# Patient Record
Sex: MALE
[De-identification: names, ages, dates, MRNs, and addresses within clinical notes are randomized per-mention and may not be internally consistent; named-entity substitution may affect disease eponyms.]

---

## 2017-04-05 ENCOUNTER — APPOINTMENT (OUTPATIENT)
Dept: ENDOCRINOLOGY | Facility: CLINIC | Age: 67
End: 2017-04-05

## 2017-04-05 VITALS
BODY MASS INDEX: 24.11 KG/M2 | HEIGHT: 72 IN | HEART RATE: 100 BPM | DIASTOLIC BLOOD PRESSURE: 81 MMHG | SYSTOLIC BLOOD PRESSURE: 129 MMHG | WEIGHT: 178 LBS

## 2017-04-07 LAB
ANION GAP SERPL CALC-SCNC: 19 MMOL/L
BUN SERPL-MCNC: 17 MG/DL
CALCIUM SERPL-MCNC: 10.4 MG/DL
CHLORIDE SERPL-SCNC: 97 MMOL/L
CO2 SERPL-SCNC: 25 MMOL/L
CREAT SERPL-MCNC: 1.01 MG/DL
GLUCOSE SERPL-MCNC: 152 MG/DL
HBA1C MFR BLD HPLC: 8.5 %
POTASSIUM SERPL-SCNC: 4.4 MMOL/L
SODIUM SERPL-SCNC: 141 MMOL/L

## 2017-05-03 ENCOUNTER — MEDICATION RENEWAL (OUTPATIENT)
Age: 67
End: 2017-05-03

## 2017-05-08 ENCOUNTER — MEDICATION RENEWAL (OUTPATIENT)
Age: 67
End: 2017-05-08

## 2017-06-02 ENCOUNTER — RX RENEWAL (OUTPATIENT)
Age: 67
End: 2017-06-02

## 2017-08-07 ENCOUNTER — APPOINTMENT (OUTPATIENT)
Dept: ENDOCRINOLOGY | Facility: CLINIC | Age: 67
End: 2017-08-07
Payer: COMMERCIAL

## 2017-08-07 VITALS
HEART RATE: 89 BPM | SYSTOLIC BLOOD PRESSURE: 147 MMHG | BODY MASS INDEX: 24.38 KG/M2 | DIASTOLIC BLOOD PRESSURE: 90 MMHG | HEIGHT: 72 IN | WEIGHT: 180 LBS

## 2017-08-07 PROCEDURE — 99214 OFFICE O/P EST MOD 30 MIN: CPT

## 2017-08-09 LAB
ANION GAP SERPL CALC-SCNC: 19 MMOL/L
BUN SERPL-MCNC: 15 MG/DL
CALCIUM SERPL-MCNC: 9.9 MG/DL
CHLORIDE SERPL-SCNC: 100 MMOL/L
CO2 SERPL-SCNC: 24 MMOL/L
CREAT SERPL-MCNC: 1.07 MG/DL
GLUCOSE SERPL-MCNC: 211 MG/DL
HBA1C MFR BLD HPLC: 8.9 %
POTASSIUM SERPL-SCNC: 4.4 MMOL/L
SODIUM SERPL-SCNC: 143 MMOL/L

## 2017-11-15 ENCOUNTER — MEDICATION RENEWAL (OUTPATIENT)
Age: 67
End: 2017-11-15

## 2018-01-25 ENCOUNTER — APPOINTMENT (OUTPATIENT)
Dept: ENDOCRINOLOGY | Facility: CLINIC | Age: 68
End: 2018-01-25
Payer: COMMERCIAL

## 2018-01-25 VITALS
WEIGHT: 176 LBS | BODY MASS INDEX: 23.87 KG/M2 | DIASTOLIC BLOOD PRESSURE: 90 MMHG | HEART RATE: 96 BPM | SYSTOLIC BLOOD PRESSURE: 180 MMHG

## 2018-01-25 PROCEDURE — 99214 OFFICE O/P EST MOD 30 MIN: CPT

## 2018-01-26 LAB
ALBUMIN SERPL ELPH-MCNC: 4.8 G/DL
ALP BLD-CCNC: 97 U/L
ALT SERPL-CCNC: 23 U/L
ANION GAP SERPL CALC-SCNC: 17 MMOL/L
AST SERPL-CCNC: 20 U/L
BILIRUB SERPL-MCNC: 0.6 MG/DL
BUN SERPL-MCNC: 15 MG/DL
CALCIUM SERPL-MCNC: 10.2 MG/DL
CHLORIDE SERPL-SCNC: 97 MMOL/L
CHOLEST SERPL-MCNC: 155 MG/DL
CHOLEST/HDLC SERPL: 3 RATIO
CO2 SERPL-SCNC: 26 MMOL/L
CREAT SERPL-MCNC: 1.1 MG/DL
CREAT SPEC-SCNC: 172 MG/DL
GLUCOSE SERPL-MCNC: 124 MG/DL
HBA1C MFR BLD HPLC: 8.6 %
HDLC SERPL-MCNC: 51 MG/DL
LDLC SERPL CALC-MCNC: 87 MG/DL
MICROALBUMIN 24H UR DL<=1MG/L-MCNC: 3.4 MG/DL
MICROALBUMIN/CREAT 24H UR-RTO: 20 MG/G
POTASSIUM SERPL-SCNC: 4.4 MMOL/L
PROT SERPL-MCNC: 6.9 G/DL
SODIUM SERPL-SCNC: 140 MMOL/L
TRIGL SERPL-MCNC: 84 MG/DL
TSH SERPL-ACNC: 1.19 UIU/ML

## 2018-01-30 ENCOUNTER — MEDICATION RENEWAL (OUTPATIENT)
Age: 68
End: 2018-01-30

## 2018-05-07 ENCOUNTER — MEDICATION RENEWAL (OUTPATIENT)
Age: 68
End: 2018-05-07

## 2018-07-25 ENCOUNTER — APPOINTMENT (OUTPATIENT)
Dept: ENDOCRINOLOGY | Facility: CLINIC | Age: 68
End: 2018-07-25
Payer: COMMERCIAL

## 2018-07-25 VITALS
BODY MASS INDEX: 24.11 KG/M2 | HEIGHT: 72 IN | SYSTOLIC BLOOD PRESSURE: 129 MMHG | HEART RATE: 103 BPM | DIASTOLIC BLOOD PRESSURE: 82 MMHG | WEIGHT: 178 LBS

## 2018-07-25 PROCEDURE — 99214 OFFICE O/P EST MOD 30 MIN: CPT

## 2018-07-25 RX ORDER — FLASH GLUCOSE SENSOR
KIT MISCELLANEOUS
Qty: 1 | Refills: 0 | Status: DISCONTINUED | COMMUNITY
Start: 2018-01-30 | End: 2018-07-25

## 2018-07-25 RX ORDER — FLASH GLUCOSE SENSOR
KIT MISCELLANEOUS
Qty: 3 | Refills: 5 | Status: DISCONTINUED | COMMUNITY
Start: 2018-01-30 | End: 2018-07-25

## 2018-07-26 LAB
ANION GAP SERPL CALC-SCNC: 15 MMOL/L
BUN SERPL-MCNC: 12 MG/DL
CALCIUM SERPL-MCNC: 10.1 MG/DL
CHLORIDE SERPL-SCNC: 101 MMOL/L
CO2 SERPL-SCNC: 27 MMOL/L
CREAT SERPL-MCNC: 1.04 MG/DL
GLUCOSE SERPL-MCNC: 76 MG/DL
HBA1C MFR BLD HPLC: 8.4 %
POTASSIUM SERPL-SCNC: 4.9 MMOL/L
SODIUM SERPL-SCNC: 143 MMOL/L

## 2018-11-12 ENCOUNTER — MEDICATION RENEWAL (OUTPATIENT)
Age: 68
End: 2018-11-12

## 2018-11-28 ENCOUNTER — APPOINTMENT (OUTPATIENT)
Dept: ENDOCRINOLOGY | Facility: CLINIC | Age: 68
End: 2018-11-28

## 2019-01-28 ENCOUNTER — RX RENEWAL (OUTPATIENT)
Age: 69
End: 2019-01-28

## 2019-01-31 ENCOUNTER — RX RENEWAL (OUTPATIENT)
Age: 69
End: 2019-01-31

## 2019-03-20 ENCOUNTER — APPOINTMENT (OUTPATIENT)
Dept: ENDOCRINOLOGY | Facility: CLINIC | Age: 69
End: 2019-03-20
Payer: COMMERCIAL

## 2019-03-20 VITALS
WEIGHT: 173 LBS | BODY MASS INDEX: 23.46 KG/M2 | HEART RATE: 114 BPM | SYSTOLIC BLOOD PRESSURE: 122 MMHG | DIASTOLIC BLOOD PRESSURE: 79 MMHG

## 2019-03-20 PROCEDURE — 36415 COLL VENOUS BLD VENIPUNCTURE: CPT

## 2019-03-20 PROCEDURE — 99214 OFFICE O/P EST MOD 30 MIN: CPT | Mod: 25

## 2019-03-20 NOTE — ASSESSMENT
[FreeTextEntry1] : Diabetes, with microalbuminuria, on ACEI.  Suboptimal control,  goal A1c < 7.0%. I am agreeable to him trying afrezza.  will send for PFTs and if normal, then will prescribe afrezza.  Explained that he will still need to inject basal insulin once/daily and take afrezza, which comes in different dosing blister packs, before meals.  Advised him to check sugars more regularly, samanta after changing to afrezza, so I know if it is controlling sugars or not.\par RTO 4 months

## 2019-03-20 NOTE — PHYSICAL EXAM
[Alert] : alert [Healthy Appearance] : healthy appearance [Normal Voice/Communication] : normal voice communication [No Proptosis] : no proptosis [No Lid Lag] : no lid lag [Normal Hearing] : hearing was normal [Thyroid Not Enlarged] : the thyroid was not enlarged [No Thyroid Nodules] : there were no palpable thyroid nodules [Clear to Auscultation] : lungs were clear to auscultation bilaterally [Normal S1, S2] : normal S1 and S2 [Regular Rhythm] : with a regular rhythm [Pedal Pulses Normal] : the pedal pulses are present [No Edema] : there was no peripheral edema [No Stigmata of Cushings Syndrome] : no stigmata of cushings syndrome [Normal Sensation on Monofilament Testing] : normal sensation on monofilament testing of lower extremities [Normal Insight/Judgement] : insight and judgment were intact [Normal Affect] : the affect was normal [Normal Mood] : the mood was normal [Foot Ulcers] : no foot ulcers [Acanthosis Nigricans] : no acanthosis nigricans [de-identified] : fingerstick 198, pre lunch/6h post breakfast (cornflakes, berries, crackers with PB, 30 units) [de-identified] : reg tachy [de-identified] : no onychomycoses

## 2019-03-20 NOTE — DATA REVIEWED
[FreeTextEntry1] : 7/18: A1c 8.4%, Cr 1.04\par 1/18: A1c 8.6%, tot chol 155, trig 84, HDL 51, LDL 87, urine microalb/cr 20, TSH 1.19, Cr 1.10\par 8/17: A1c 8.9%,Cr .1.07\par 4/17: A1c 8.5%, Cr 1.01, \par 12/16: A1c 10.0%, tot chol 164, trig 186, HDL 43, LDL 84, urine microalb/cr 18, Cr 1.12\par 4/16: A1c 7.8%\par 10/15: A1c 9.6%, Cr 0.79, tot chol 127, trig 201, HDL 37, LDL 50, TSH 0.792, urine microalb/cr 29

## 2019-03-20 NOTE — HISTORY OF PRESENT ILLNESS
[FreeTextEntry1] : not testing glucose at home.\par appetite is reduced and he lost weight\par urinating less. \par interested in inhaled insulin because it is difficulty to inject insulin for lunch\par still has not seen ophtho\par no chest pain or SOB. has stable neuropathy symptoms, mild.\par \par Meds: Humalog mix 50/50, 32 units with breakfast, 25-30 with lunch, 25-32 with dinner\par simvastatin 10mg\par lisinopril 20mg once/day (did not increase to BID)\par HCTZ 25mg--stopped taking

## 2019-03-21 LAB
ALBUMIN SERPL ELPH-MCNC: 4.9 G/DL
ALP BLD-CCNC: 110 U/L
ALT SERPL-CCNC: 18 U/L
ANION GAP SERPL CALC-SCNC: 19 MMOL/L
AST SERPL-CCNC: 16 U/L
BILIRUB SERPL-MCNC: 0.3 MG/DL
BUN SERPL-MCNC: 12 MG/DL
CALCIUM SERPL-MCNC: 9.8 MG/DL
CHLORIDE SERPL-SCNC: 99 MMOL/L
CHOLEST SERPL-MCNC: 131 MG/DL
CHOLEST/HDLC SERPL: 3.1 RATIO
CO2 SERPL-SCNC: 22 MMOL/L
CREAT SERPL-MCNC: 0.9 MG/DL
CREAT SPEC-SCNC: 173 MG/DL
GLUCOSE SERPL-MCNC: 204 MG/DL
HBA1C MFR BLD HPLC: 9.8 %
HDLC SERPL-MCNC: 43 MG/DL
LDLC SERPL CALC-MCNC: 67 MG/DL
MICROALBUMIN 24H UR DL<=1MG/L-MCNC: 3.2 MG/DL
MICROALBUMIN/CREAT 24H UR-RTO: 18 MG/G
POTASSIUM SERPL-SCNC: 4.4 MMOL/L
PROT SERPL-MCNC: 7 G/DL
SODIUM SERPL-SCNC: 140 MMOL/L
TRIGL SERPL-MCNC: 107 MG/DL
TSH SERPL-ACNC: 1.05 UIU/ML

## 2019-04-05 ENCOUNTER — APPOINTMENT (OUTPATIENT)
Dept: PULMONOLOGY | Facility: CLINIC | Age: 69
End: 2019-04-05
Payer: COMMERCIAL

## 2019-04-05 VITALS
HEART RATE: 98 BPM | WEIGHT: 179 LBS | DIASTOLIC BLOOD PRESSURE: 77 MMHG | OXYGEN SATURATION: 99 % | BODY MASS INDEX: 20.71 KG/M2 | TEMPERATURE: 99 F | SYSTOLIC BLOOD PRESSURE: 128 MMHG | HEIGHT: 78 IN

## 2019-04-05 PROCEDURE — 99244 OFF/OP CNSLTJ NEW/EST MOD 40: CPT

## 2019-04-05 NOTE — ASSESSMENT
[FreeTextEntry1] : 69 yo man with dm referred for PFTs prior to Afrezza use. Referred by Dr. Atkinson. There have been reports of acute bronchoconstriction in patients with COPD and asthma. Mr. Ochoa does not have any clinical evidence of airway disease. PFTs are required for an objective assessment. He was scheduled for PFTs for 4/11/2019 on [address]. I will follow up with Mr. Ochoa by telephone after the PFTs are resulted, and will send an additional note to his endocrinologist.

## 2019-04-05 NOTE — CONSULT LETTER
[Dear  ___] : Dear  [unfilled], [Consult Letter:] : I had the pleasure of evaluating your patient, [unfilled]. [Please see my note below.] : Please see my note below. [Consult Closing:] : Thank you very much for allowing me to participate in the care of this patient.  If you have any questions, please do not hesitate to contact me. [Sincerely,] : Sincerely, [FreeTextEntry3] : Fatimah Murphy MD\par \par [employer] [facility] [employer] [facility]\par Pulmonary, Critical Care, and Sleep Medicine\par

## 2019-04-05 NOTE — PHYSICAL EXAM
[General Appearance - Well Developed] : well developed [Normal Appearance] : normal appearance [Well Groomed] : well groomed [General Appearance - Well Nourished] : well nourished [No Deformities] : no deformities [General Appearance - In No Acute Distress] : no acute distress [Normal Conjunctiva] : the conjunctiva exhibited no abnormalities [Normal Oropharynx] : normal oropharynx [Neck Appearance] : the appearance of the neck was normal [Heart Rate And Rhythm] : heart rate and rhythm were normal [Heart Sounds] : normal S1 and S2 [] : no respiratory distress [Respiration, Rhythm And Depth] : normal respiratory rhythm and effort [Exaggerated Use Of Accessory Muscles For Inspiration] : no accessory muscle use [Auscultation Breath Sounds / Voice Sounds] : lungs were clear to auscultation bilaterally [Bowel Sounds] : normal bowel sounds [Abdomen Soft] : soft [Abnormal Walk] : normal gait [Nail Clubbing] : no clubbing of the fingernails [Cyanosis, Localized] : no localized cyanosis [Skin Color & Pigmentation] : normal skin color and pigmentation [Skin Turgor] : normal skin turgor [No Focal Deficits] : no focal deficits [Oriented To Time, Place, And Person] : oriented to person, place, and time [Impaired Insight] : insight and judgment were intact [Affect] : the affect was normal [Mood] : the mood was normal

## 2019-04-05 NOTE — HISTORY OF PRESENT ILLNESS
[Stable] : are stable [Difficulty Breathing During Exertion] : denies dyspnea on exertion [Feelings Of Weakness On Exertion] : denies exercise intolerance [Cough] : denies coughing [Wheezing] : denies wheezing [Regional Soft Tissue Swelling Both Lower Extremities] : denies lower extremity edema [Chest Pain Or Discomfort] : denies chest pain [Fever] : denies fever [Oxygen] : the patient uses no supplemental oxygen [0  -  Nothing at all] : 0, nothing at all [Class I - No Symptoms and No Limitations] : I [FreeTextEntry1] : 69 yo man with dm referred for PFTs prior to Afrezza use. He is very active, is a marathoner. Does not have any exercise limitation. No respiratory distress. No lung pathology in the past. 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 No occupational exposures.

## 2019-04-11 ENCOUNTER — APPOINTMENT (OUTPATIENT)
Dept: PULMONOLOGY | Facility: CLINIC | Age: 69
End: 2019-04-11
Payer: COMMERCIAL

## 2019-04-11 PROCEDURE — 94729 DIFFUSING CAPACITY: CPT

## 2019-04-11 PROCEDURE — 94726 PLETHYSMOGRAPHY LUNG VOLUMES: CPT

## 2019-04-11 PROCEDURE — 94060 EVALUATION OF WHEEZING: CPT

## 2019-04-15 ENCOUNTER — MEDICATION RENEWAL (OUTPATIENT)
Age: 69
End: 2019-04-15

## 2019-05-13 ENCOUNTER — APPOINTMENT (OUTPATIENT)
Dept: ENDOCRINOLOGY | Facility: CLINIC | Age: 69
End: 2019-05-13

## 2019-07-10 ENCOUNTER — APPOINTMENT (OUTPATIENT)
Dept: ENDOCRINOLOGY | Facility: CLINIC | Age: 69
End: 2019-07-10

## 2019-10-11 ENCOUNTER — APPOINTMENT (OUTPATIENT)
Dept: ENDOCRINOLOGY | Facility: CLINIC | Age: 69
End: 2019-10-11
Payer: COMMERCIAL

## 2019-10-11 VITALS
HEART RATE: 103 BPM | WEIGHT: 182 LBS | DIASTOLIC BLOOD PRESSURE: 75 MMHG | SYSTOLIC BLOOD PRESSURE: 125 MMHG | BODY MASS INDEX: 19.5 KG/M2

## 2019-10-11 PROCEDURE — 99214 OFFICE O/P EST MOD 30 MIN: CPT

## 2019-10-11 RX ORDER — INSULIN HUMAN 4-8-12(60)
4 & 8 & 12 KIT INHALATION
Qty: 1 | Refills: 5 | Status: DISCONTINUED | COMMUNITY
Start: 2019-04-15 | End: 2019-10-11

## 2019-10-11 RX ORDER — INSULIN GLARGINE 100 [IU]/ML
100 INJECTION, SOLUTION SUBCUTANEOUS
Qty: 1 | Refills: 5 | Status: DISCONTINUED | COMMUNITY
Start: 2019-04-15 | End: 2019-10-11

## 2019-10-11 RX ORDER — INSULIN HUMAN 12 1/1
12 POWDER, METERED RESPIRATORY (INHALATION)
Qty: 2 | Refills: 3 | Status: ACTIVE | COMMUNITY
Start: 2019-10-11 | End: 1900-01-01

## 2019-10-11 NOTE — PHYSICAL EXAM
[Alert] : alert [No Proptosis] : no proptosis [Normal Voice/Communication] : normal voice communication [Healthy Appearance] : healthy appearance [No Lid Lag] : no lid lag [Normal Hearing] : hearing was normal [No Thyroid Nodules] : there were no palpable thyroid nodules [Thyroid Not Enlarged] : the thyroid was not enlarged [Regular Rhythm] : with a regular rhythm [Clear to Auscultation] : lungs were clear to auscultation bilaterally [Normal S1, S2] : normal S1 and S2 [No Stigmata of Cushings Syndrome] : no stigmata of cushings syndrome [No Edema] : there was no peripheral edema [Pedal Pulses Normal] : the pedal pulses are present [Normal Insight/Judgement] : insight and judgment were intact [Normal Sensation on Monofilament Testing] : normal sensation on monofilament testing of lower extremities [Normal Affect] : the affect was normal [Normal Mood] : the mood was normal [Foot Ulcers] : no foot ulcers [Acanthosis Nigricans] : no acanthosis nigricans [de-identified] : reg tachy [de-identified] : no onychomycoses

## 2019-10-11 NOTE — HISTORY OF PRESENT ILLNESS
[FreeTextEntry1] : testing glucose twice/day.  tried Aline, didn't like it, threw it away.  said he would rather wear a pump.\par glucose log reviewed (which only goes until 7/2019), ranging mostly \par had hypoglycemia symptoms on the way to work today, so drank 3 sips of OJ and glucose was 209 when he got to work\par recheck before coming here, and glucose was 108 after taking insulin at work\par likes afrezza though it makes his throat dry.  But he likes this over taking insulin at work, because it's hard to find a clean, private area for him to inject\par So he is doing insulin am and pm, at home, and afrezza 12-24 units with lunch\par saw rodney, was told "nothing of any concern" in his eyes\par evaluated for LLQ achiness.  CT scan was negative.  sono showed 2 small polyps.  colonoscopy removed 1 benign polyp.  \par no chest pain or SOB. no palpitations. has stable neuropathy symptoms, mild.\par scheduled to get flu vaccine\par \par Meds: Humalog mix 50/50, 32-34 units BID with breakfast and dinner\par afrezza 12-24 units with lunch -- but ran out 1-2 months ago\par simvastatin 10mg\par lisinopril 20mg once/day (did not increase to BID)\par HCTZ 25mg--stopped taking

## 2019-10-11 NOTE — ASSESSMENT
[FreeTextEntry1] : Diabetes, with microalbuminuria, on ACEI.   goal A1c < 7.0%. \par continue insulin for am and pm and afrezza at lunchtime.  will need to do follow up PFTs\par discussed pump options briefly; he may like Omnipod, which does not have tubing.  He will think about it.\par RTO 4 months\par

## 2019-10-14 LAB
ANION GAP SERPL CALC-SCNC: 14 MMOL/L
BUN SERPL-MCNC: 12 MG/DL
CALCIUM SERPL-MCNC: 10 MG/DL
CHLORIDE SERPL-SCNC: 101 MMOL/L
CO2 SERPL-SCNC: 25 MMOL/L
CREAT SERPL-MCNC: 0.9 MG/DL
ESTIMATED AVERAGE GLUCOSE: 206 MG/DL
GLUCOSE SERPL-MCNC: 108 MG/DL
HBA1C MFR BLD HPLC: 8.8 %
POTASSIUM SERPL-SCNC: 4.1 MMOL/L
SODIUM SERPL-SCNC: 140 MMOL/L

## 2019-11-12 ENCOUNTER — MEDICATION RENEWAL (OUTPATIENT)
Age: 69
End: 2019-11-12

## 2020-01-22 ENCOUNTER — MEDICATION RENEWAL (OUTPATIENT)
Age: 70
End: 2020-01-22

## 2020-01-24 RX ORDER — INSULIN ASPART 100 [IU]/ML
(70-30) 100 INJECTION, SUSPENSION SUBCUTANEOUS
Qty: 2 | Refills: 1 | Status: DISCONTINUED | COMMUNITY
Start: 2020-01-22 | End: 2020-01-24

## 2020-02-07 ENCOUNTER — APPOINTMENT (OUTPATIENT)
Dept: ENDOCRINOLOGY | Facility: CLINIC | Age: 70
End: 2020-02-07

## 2020-03-29 ENCOUNTER — RX RENEWAL (OUTPATIENT)
Age: 70
End: 2020-03-29

## 2020-05-18 ENCOUNTER — APPOINTMENT (OUTPATIENT)
Dept: ENDOCRINOLOGY | Facility: CLINIC | Age: 70
End: 2020-05-18
Payer: COMMERCIAL

## 2020-05-18 PROCEDURE — 99214 OFFICE O/P EST MOD 30 MIN: CPT | Mod: 95

## 2020-05-18 RX ORDER — BLOOD SUGAR DIAGNOSTIC
STRIP MISCELLANEOUS 3 TIMES DAILY
Qty: 3 | Refills: 3 | Status: ACTIVE | COMMUNITY
Start: 2018-05-07 | End: 1900-01-01

## 2020-05-18 NOTE — ASSESSMENT
[FreeTextEntry1] : Diabetes, with microalbuminuria, on ACEI.   goal A1c < 7.0%. \par Consistently high morning sugars, probably due to hyperglycemia after dinner, carrying over to following morning.  Increase pm dinner insulin dose to 40 units.\par Reduce am dose to 32 units, since after breakfast glucose tends to trend on the lower side of normal.\par can either take insulin 50/50 mix or afrezza for lunch coverage.\par will send [facility] form to check labs.\par L hand neuropathy may be carpal tunnel?  (one sided and not affecting all fingers)\par I gave him some pump-CGM systems to research, if he is interested (medtronic 670 auto basal and T slim Basal IQ) but pumps usually require more frequent glucose testing than he normally does. \par continue statin therapy.\par RTO 3 months\par

## 2020-05-18 NOTE — HISTORY OF PRESENT ILLNESS
[Home] : at home, [unfilled] , at the time of the visit. [Medical Office: ([facility])___] : at the medical office located in  [Patient] : the patient [Self] : self [FreeTextEntry1] : working from home, finds he is more productive at home than at work\par but he knows he is not active enough being at home.  walks dog about 1 mile/day and doing work around the house and yard.\par recent glucose readings:  testing twice/day\par today 209 (8a), 73 (11a), 138 now, pre lunch\par yesterday: 212 (8a) 132 (1p)\par am: 212, 209, 183, 201\par 11a: 81, 73\par pm: 163, 168, 159, 132 (either after lunch or pre dinner)\par last eye visit over a year ago, no problems with eyes\par weight is the same\par numbness in L hand, a few fingers, definitely the pointer finger, new.  and feels different from the numbness in his feet, which is stable.\par no chest pain or SOB. no fatigue.\par recently diagnosed with allergies when he presented with some skin issues and taking Zyrtec which did help his skin\par at the time, was also coughing, so then stopped using afrezza, but still wants to have it around (he may go back to it)\par \par Meds: Humalog mix 50/50, 36 with breakfast, 12-15 with lunch, 36 with dinner\par afrezza 12-24 units with lunch -- not currently doing (doing insulin inj instead)\par simvastatin 10mg\par lisinopril 20mg BID\par Previous meds: HCTZ, Invokana (UTI), glyburide (changed to insulin)

## 2020-05-18 NOTE — DATA REVIEWED
[FreeTextEntry1] : 10/19: A1c 8.8%, Cr 0.90, GFR 88\par 3/19: A1c 9.8%, tot chol 131, trig 107, HDL 43, LDL 67, urine microalbumin/cr 18, TSH 1.05\par 7/18: A1c 8.4%, Cr 1.04\par 1/18: A1c 8.6%, tot chol 155, trig 84, HDL 51, LDL 87, urine microalb/cr 20, TSH 1.19, Cr 1.10\par 8/17: A1c 8.9%,Cr .1.07\par 4/17: A1c 8.5%, Cr 1.01, \par 12/16: A1c 10.0%, tot chol 164, trig 186, HDL 43, LDL 84, urine microalb/cr 18, Cr 1.12\par 4/16: A1c 7.8%\par 10/15: A1c 9.6%, Cr 0.79, tot chol 127, trig 201, HDL 37, LDL 50, TSH 0.792, urine microalb/cr 29

## 2020-08-06 ENCOUNTER — RX RENEWAL (OUTPATIENT)
Age: 70
End: 2020-08-06

## 2021-03-02 ENCOUNTER — APPOINTMENT (OUTPATIENT)
Dept: ENDOCRINOLOGY | Facility: CLINIC | Age: 71
End: 2021-03-02

## 2021-03-19 ENCOUNTER — RX RENEWAL (OUTPATIENT)
Age: 71
End: 2021-03-19

## 2021-05-25 ENCOUNTER — RX RENEWAL (OUTPATIENT)
Age: 71
End: 2021-05-25

## 2021-06-17 ENCOUNTER — RX RENEWAL (OUTPATIENT)
Age: 71
End: 2021-06-17

## 2021-07-02 ENCOUNTER — APPOINTMENT (OUTPATIENT)
Dept: ENDOCRINOLOGY | Facility: CLINIC | Age: 71
End: 2021-07-02
Payer: COMMERCIAL

## 2021-07-02 VITALS
BODY MASS INDEX: 20.25 KG/M2 | SYSTOLIC BLOOD PRESSURE: 135 MMHG | DIASTOLIC BLOOD PRESSURE: 75 MMHG | HEART RATE: 109 BPM | WEIGHT: 189 LBS

## 2021-07-02 DIAGNOSIS — Z00.00 ENCOUNTER FOR GENERAL ADULT MEDICAL EXAMINATION W/OUT ABNORMAL FINDINGS: ICD-10-CM

## 2021-07-02 PROCEDURE — 99072 ADDL SUPL MATRL&STAF TM PHE: CPT

## 2021-07-02 PROCEDURE — 99214 OFFICE O/P EST MOD 30 MIN: CPT

## 2021-07-02 NOTE — ASSESSMENT
[FreeTextEntry1] : Diabetes, with microalbuminuria, on ACEI.   goal A1c < 7.0%. \par Recommended using CGM to facilitate frequent glucose testing because he should be testing frequently to determine insulin dosing and monitor for hypoglycemia.   Aline 2 reader given and we watched video on how to place Aline sensor.\par Continue insulin regimen.\par ophtho recommended.  will do labs today.\par continue statin therapy.\par RTO 6 months\par

## 2021-07-02 NOTE — PHYSICAL EXAM
Patient was last seen 1/27/17 and has an appointment 3/1/2017.   Script was eprescribed to pharmacy per Dr. Schultz for 6 months.       [Alert] : alert [No Acute Distress] : no acute distress [No Proptosis] : no proptosis [No Lid Lag] : no lid lag [Normal Hearing] : hearing was normal [No LAD] : no lymphadenopathy [Thyroid Not Enlarged] : the thyroid was not enlarged [Clear to Auscultation] : lungs were clear to auscultation bilaterally [Normal S1, S2] : normal S1 and S2 [Regular Rhythm] : with a regular rhythm [No Edema] : no peripheral edema [Pedal Pulses Normal] : the pedal pulses are present [Normal Sensation on Monofilament Testing] : normal sensation on monofilament testing of lower extremities [Normal Affect] : the affect was normal [Normal Mood] : the mood was normal [Acanthosis Nigricans] : no acanthosis nigricans [Foot Ulcers] : no foot ulcers [de-identified] : no onychomycoses

## 2021-07-02 NOTE — HISTORY OF PRESENT ILLNESS
[FreeTextEntry1] :  had a quiet year\par was diagnosed with pneumonia in Nov 2019 and wonders if this was Covid\par fully vaccinated for Covid (Pfizer)\par no  SOB, chest pain.  numbness in feet is the same\par has not seen ophtho.  Sees dentist, urologist and me (no PCP).  But he has gone for colonoscopy (wife arranged)\par not testing glucose at home regularly\par no frequent hypoglycemia symptoms\par \par Meds: Humalog mix 50/50, 40 with breakfast,  40 with dinner\par simvastatin 10mg\par lisinopril 20mg BID\par Previous meds: HCTZ, Invokana (UTI), glyburide (changed to insulin), afrezza (?cough)

## 2021-07-06 LAB
ALBUMIN SERPL ELPH-MCNC: 4.7 G/DL
ALP BLD-CCNC: 111 U/L
ALT SERPL-CCNC: 24 U/L
ANION GAP SERPL CALC-SCNC: 12 MMOL/L
AST SERPL-CCNC: 22 U/L
BASOPHILS # BLD AUTO: 0.04 K/UL
BASOPHILS NFR BLD AUTO: 0.5 %
BILIRUB SERPL-MCNC: 0.4 MG/DL
BUN SERPL-MCNC: 15 MG/DL
CALCIUM SERPL-MCNC: 9.8 MG/DL
CHLORIDE SERPL-SCNC: 104 MMOL/L
CHOLEST SERPL-MCNC: 137 MG/DL
CO2 SERPL-SCNC: 24 MMOL/L
CREAT SERPL-MCNC: 0.94 MG/DL
CREAT SPEC-SCNC: 148 MG/DL
EOSINOPHIL # BLD AUTO: 0.14 K/UL
EOSINOPHIL NFR BLD AUTO: 1.7 %
ESTIMATED AVERAGE GLUCOSE: 189 MG/DL
GLUCOSE SERPL-MCNC: 151 MG/DL
HBA1C MFR BLD HPLC: 8.2 %
HCT VFR BLD CALC: 42.4 %
HDLC SERPL-MCNC: 45 MG/DL
HGB BLD-MCNC: 14.9 G/DL
IMM GRANULOCYTES NFR BLD AUTO: 0.7 %
LDLC SERPL CALC-MCNC: 63 MG/DL
LYMPHOCYTES # BLD AUTO: 1.52 K/UL
LYMPHOCYTES NFR BLD AUTO: 18.1 %
MAN DIFF?: NORMAL
MCHC RBC-ENTMCNC: 31.4 PG
MCHC RBC-ENTMCNC: 35.1 GM/DL
MCV RBC AUTO: 89.3 FL
MICROALBUMIN 24H UR DL<=1MG/L-MCNC: 2 MG/DL
MICROALBUMIN/CREAT 24H UR-RTO: 14 MG/G
MONOCYTES # BLD AUTO: 0.78 K/UL
MONOCYTES NFR BLD AUTO: 9.3 %
NEUTROPHILS # BLD AUTO: 5.88 K/UL
NEUTROPHILS NFR BLD AUTO: 69.7 %
NONHDLC SERPL-MCNC: 92 MG/DL
PLATELET # BLD AUTO: 226 K/UL
POTASSIUM SERPL-SCNC: 4.2 MMOL/L
PROT SERPL-MCNC: 6.9 G/DL
RBC # BLD: 4.75 M/UL
RBC # FLD: 12.1 %
SODIUM SERPL-SCNC: 141 MMOL/L
TRIGL SERPL-MCNC: 147 MG/DL
TSH SERPL-ACNC: 1.03 UIU/ML
WBC # FLD AUTO: 8.42 K/UL

## 2021-07-15 ENCOUNTER — RX RENEWAL (OUTPATIENT)
Age: 71
End: 2021-07-15

## 2021-07-15 RX ORDER — INSULIN LISPRO 100 [IU]/ML
(50-50) 100 INJECTION, SUSPENSION SUBCUTANEOUS
Qty: 90 | Refills: 3 | Status: ACTIVE | COMMUNITY
Start: 2020-01-24 | End: 1900-01-01

## 2021-08-30 ENCOUNTER — RX RENEWAL (OUTPATIENT)
Age: 71
End: 2021-08-30

## 2021-08-30 RX ORDER — LISINOPRIL 20 MG/1
20 TABLET ORAL
Qty: 180 | Refills: 3 | Status: ACTIVE | COMMUNITY
Start: 2020-08-06 | End: 1900-01-01

## 2021-09-15 ENCOUNTER — RX RENEWAL (OUTPATIENT)
Age: 71
End: 2021-09-15

## 2021-11-09 RX ORDER — FLASH GLUCOSE SENSOR
KIT MISCELLANEOUS
Qty: 6 | Refills: 3 | Status: ACTIVE | COMMUNITY
Start: 2021-07-02 | End: 1900-01-01

## 2021-12-07 ENCOUNTER — NEW PATIENT (OUTPATIENT)
Dept: URBAN - METROPOLITAN AREA CLINIC 14 | Facility: CLINIC | Age: 71
End: 2021-12-07

## 2021-12-07 DIAGNOSIS — H43.813: ICD-10-CM

## 2021-12-07 DIAGNOSIS — H35.371: ICD-10-CM

## 2021-12-07 DIAGNOSIS — H25.13: ICD-10-CM

## 2021-12-07 PROCEDURE — 92134 CPTRZ OPH DX IMG PST SGM RTA: CPT

## 2021-12-07 PROCEDURE — 92202 OPSCPY EXTND ON/MAC DRAW: CPT

## 2021-12-07 PROCEDURE — 99244 OFF/OP CNSLTJ NEW/EST MOD 40: CPT

## 2021-12-07 ASSESSMENT — TONOMETRY
OS_IOP_MMHG: 19
OD_IOP_MMHG: 14

## 2021-12-07 ASSESSMENT — VISUAL ACUITY
OD_CC: 20/20
OS_PH: 20/20
OS_CC: 20/25+1

## 2022-10-27 ENCOUNTER — RX RENEWAL (OUTPATIENT)
Age: 72
End: 2022-10-27

## 2023-01-13 ENCOUNTER — FOLLOW UP (OUTPATIENT)
Dept: URBAN - METROPOLITAN AREA CLINIC 14 | Facility: CLINIC | Age: 73
End: 2023-01-13

## 2023-01-13 DIAGNOSIS — H43.813: ICD-10-CM

## 2023-01-13 DIAGNOSIS — H35.371: ICD-10-CM

## 2023-01-13 DIAGNOSIS — H25.13: ICD-10-CM

## 2023-01-13 DIAGNOSIS — H40.013: ICD-10-CM

## 2023-01-13 PROCEDURE — 92202 OPSCPY EXTND ON/MAC DRAW: CPT

## 2023-01-13 PROCEDURE — 92014 COMPRE OPH EXAM EST PT 1/>: CPT

## 2023-01-13 PROCEDURE — 92134 CPTRZ OPH DX IMG PST SGM RTA: CPT

## 2023-01-13 ASSESSMENT — TONOMETRY
OD_IOP_MMHG: 18
OS_IOP_MMHG: 18

## 2023-01-13 ASSESSMENT — VISUAL ACUITY
OS_CC: 20/25
OS_PH: 20/20
OD_CC: 20/20

## 2025-01-03 ENCOUNTER — FOLLOW UP (OUTPATIENT)
Dept: URBAN - METROPOLITAN AREA CLINIC 14 | Facility: CLINIC | Age: 75
End: 2025-01-03

## 2025-01-03 DIAGNOSIS — H35.371: ICD-10-CM

## 2025-01-03 DIAGNOSIS — H43.813: ICD-10-CM

## 2025-01-03 DIAGNOSIS — E11.9: ICD-10-CM

## 2025-01-03 PROCEDURE — 92014 COMPRE OPH EXAM EST PT 1/>: CPT

## 2025-01-03 PROCEDURE — 92134 CPTRZ OPH DX IMG PST SGM RTA: CPT

## 2025-01-03 PROCEDURE — 92202 OPSCPY EXTND ON/MAC DRAW: CPT

## 2025-01-03 ASSESSMENT — VISUAL ACUITY
OS_CC: 20/25-2
OD_CC: 20/20-2

## 2025-01-03 ASSESSMENT — TONOMETRY
OD_IOP_MMHG: 16
OS_IOP_MMHG: 18